# Patient Record
Sex: MALE | Race: WHITE | NOT HISPANIC OR LATINO | Employment: OTHER | URBAN - METROPOLITAN AREA
[De-identification: names, ages, dates, MRNs, and addresses within clinical notes are randomized per-mention and may not be internally consistent; named-entity substitution may affect disease eponyms.]

---

## 2019-07-20 ENCOUNTER — HOSPITAL ENCOUNTER (EMERGENCY)
Facility: HOSPITAL | Age: 84
Discharge: HOME | End: 2019-07-20
Attending: EMERGENCY MEDICINE | Admitting: EMERGENCY MEDICINE

## 2019-07-20 ENCOUNTER — APPOINTMENT (OUTPATIENT)
Dept: CT IMAGING | Facility: HOSPITAL | Age: 84
End: 2019-07-20

## 2019-07-20 VITALS
HEART RATE: 57 BPM | SYSTOLIC BLOOD PRESSURE: 139 MMHG | BODY MASS INDEX: 25.84 KG/M2 | HEIGHT: 73 IN | TEMPERATURE: 97.4 F | WEIGHT: 195 LBS | RESPIRATION RATE: 18 BRPM | OXYGEN SATURATION: 98 % | DIASTOLIC BLOOD PRESSURE: 76 MMHG

## 2019-07-20 DIAGNOSIS — R51.9 HEADACHE, UNSPECIFIED HEADACHE TYPE: Primary | ICD-10-CM

## 2019-07-20 LAB
ALBUMIN SERPL-MCNC: 4.1 G/DL (ref 3.5–5.2)
ALBUMIN/GLOB SERPL: 1.2 G/DL
ALP SERPL-CCNC: 76 U/L (ref 39–117)
ALT SERPL W P-5'-P-CCNC: 16 U/L (ref 1–41)
ANION GAP SERPL CALCULATED.3IONS-SCNC: 12.3 MMOL/L (ref 5–15)
AST SERPL-CCNC: 16 U/L (ref 1–40)
BACTERIA UR QL AUTO: ABNORMAL /HPF
BASOPHILS # BLD AUTO: 0.03 10*3/MM3 (ref 0–0.2)
BASOPHILS NFR BLD AUTO: 0.4 % (ref 0–1.5)
BILIRUB SERPL-MCNC: 0.7 MG/DL (ref 0.2–1.2)
BILIRUB UR QL STRIP: NEGATIVE
BUN BLD-MCNC: 14 MG/DL (ref 8–23)
BUN/CREAT SERPL: 14.1 (ref 7–25)
CALCIUM SPEC-SCNC: 10.5 MG/DL (ref 8.6–10.5)
CHLORIDE SERPL-SCNC: 106 MMOL/L (ref 98–107)
CLARITY UR: CLEAR
CO2 SERPL-SCNC: 22.7 MMOL/L (ref 22–29)
COLOR UR: ABNORMAL
CREAT BLD-MCNC: 0.99 MG/DL (ref 0.76–1.27)
DEPRECATED RDW RBC AUTO: 44 FL (ref 37–54)
EOSINOPHIL # BLD AUTO: 0.4 10*3/MM3 (ref 0–0.4)
EOSINOPHIL NFR BLD AUTO: 5.1 % (ref 0.3–6.2)
ERYTHROCYTE [DISTWIDTH] IN BLOOD BY AUTOMATED COUNT: 12.9 % (ref 12.3–15.4)
GFR SERPL CREATININE-BSD FRML MDRD: 72 ML/MIN/1.73
GLOBULIN UR ELPH-MCNC: 3.5 GM/DL
GLUCOSE BLD-MCNC: 151 MG/DL (ref 65–99)
GLUCOSE UR STRIP-MCNC: NEGATIVE MG/DL
HCT VFR BLD AUTO: 40.3 % (ref 37.5–51)
HGB BLD-MCNC: 13.5 G/DL (ref 13–17.7)
HGB UR QL STRIP.AUTO: NEGATIVE
HYALINE CASTS UR QL AUTO: ABNORMAL /LPF
IMM GRANULOCYTES # BLD AUTO: 0.03 10*3/MM3 (ref 0–0.05)
IMM GRANULOCYTES NFR BLD AUTO: 0.4 % (ref 0–0.5)
INR PPP: 1.04 (ref 0.9–1.1)
KETONES UR QL STRIP: NEGATIVE
LEUKOCYTE ESTERASE UR QL STRIP.AUTO: ABNORMAL
LYMPHOCYTES # BLD AUTO: 1.22 10*3/MM3 (ref 0.7–3.1)
LYMPHOCYTES NFR BLD AUTO: 15.5 % (ref 19.6–45.3)
MCH RBC QN AUTO: 31 PG (ref 26.6–33)
MCHC RBC AUTO-ENTMCNC: 33.5 G/DL (ref 31.5–35.7)
MCV RBC AUTO: 92.4 FL (ref 79–97)
MONOCYTES # BLD AUTO: 0.96 10*3/MM3 (ref 0.1–0.9)
MONOCYTES NFR BLD AUTO: 12.2 % (ref 5–12)
MUCOUS THREADS URNS QL MICRO: ABNORMAL /HPF
NEUTROPHILS # BLD AUTO: 5.25 10*3/MM3 (ref 1.7–7)
NEUTROPHILS NFR BLD AUTO: 66.4 % (ref 42.7–76)
NITRITE UR QL STRIP: POSITIVE
NRBC BLD AUTO-RTO: 0 /100 WBC (ref 0–0.2)
PH UR STRIP.AUTO: <=5 [PH] (ref 5–8)
PLATELET # BLD AUTO: 206 10*3/MM3 (ref 140–450)
PMV BLD AUTO: 10.4 FL (ref 6–12)
POTASSIUM BLD-SCNC: 4 MMOL/L (ref 3.5–5.2)
PROT SERPL-MCNC: 7.6 G/DL (ref 6–8.5)
PROT UR QL STRIP: NEGATIVE
PROTHROMBIN TIME: 13.4 SECONDS (ref 11.7–14.2)
RBC # BLD AUTO: 4.36 10*6/MM3 (ref 4.14–5.8)
RBC # UR: ABNORMAL /HPF
REF LAB TEST METHOD: ABNORMAL
SODIUM BLD-SCNC: 141 MMOL/L (ref 136–145)
SP GR UR STRIP: 1.02 (ref 1–1.03)
SQUAMOUS #/AREA URNS HPF: ABNORMAL /HPF
UROBILINOGEN UR QL STRIP: ABNORMAL
WBC NRBC COR # BLD: 7.89 10*3/MM3 (ref 3.4–10.8)
WBC UR QL AUTO: ABNORMAL /HPF

## 2019-07-20 PROCEDURE — 25010000002 ONDANSETRON PER 1 MG: Performed by: EMERGENCY MEDICINE

## 2019-07-20 PROCEDURE — 96374 THER/PROPH/DIAG INJ IV PUSH: CPT

## 2019-07-20 PROCEDURE — 99284 EMERGENCY DEPT VISIT MOD MDM: CPT

## 2019-07-20 PROCEDURE — 85610 PROTHROMBIN TIME: CPT | Performed by: EMERGENCY MEDICINE

## 2019-07-20 PROCEDURE — 80053 COMPREHEN METABOLIC PANEL: CPT | Performed by: EMERGENCY MEDICINE

## 2019-07-20 PROCEDURE — 96375 TX/PRO/DX INJ NEW DRUG ADDON: CPT

## 2019-07-20 PROCEDURE — 81001 URINALYSIS AUTO W/SCOPE: CPT | Performed by: EMERGENCY MEDICINE

## 2019-07-20 PROCEDURE — 70450 CT HEAD/BRAIN W/O DYE: CPT

## 2019-07-20 PROCEDURE — 85025 COMPLETE CBC W/AUTO DIFF WBC: CPT | Performed by: EMERGENCY MEDICINE

## 2019-07-20 PROCEDURE — 25010000002 HYDROMORPHONE PER 4 MG: Performed by: EMERGENCY MEDICINE

## 2019-07-20 RX ORDER — TAMSULOSIN HYDROCHLORIDE 0.4 MG/1
1 CAPSULE ORAL NIGHTLY
COMMUNITY

## 2019-07-20 RX ORDER — ATORVASTATIN CALCIUM 40 MG/1
40 TABLET, FILM COATED ORAL DAILY
COMMUNITY

## 2019-07-20 RX ORDER — OXYCODONE HYDROCHLORIDE AND ACETAMINOPHEN 5; 325 MG/1; MG/1
1-2 TABLET ORAL EVERY 6 HOURS PRN
Qty: 12 TABLET | Refills: 0 | Status: SHIPPED | OUTPATIENT
Start: 2019-07-20

## 2019-07-20 RX ORDER — PRASUGREL 10 MG/1
10 TABLET, FILM COATED ORAL DAILY
COMMUNITY

## 2019-07-20 RX ORDER — HYDROMORPHONE HYDROCHLORIDE 1 MG/ML
0.5 INJECTION, SOLUTION INTRAMUSCULAR; INTRAVENOUS; SUBCUTANEOUS ONCE
Status: COMPLETED | OUTPATIENT
Start: 2019-07-20 | End: 2019-07-20

## 2019-07-20 RX ORDER — ONDANSETRON 2 MG/ML
4 INJECTION INTRAMUSCULAR; INTRAVENOUS ONCE
Status: COMPLETED | OUTPATIENT
Start: 2019-07-20 | End: 2019-07-20

## 2019-07-20 RX ORDER — CARVEDILOL 3.12 MG/1
3.12 TABLET ORAL 2 TIMES DAILY WITH MEALS
COMMUNITY

## 2019-07-20 RX ADMIN — ONDANSETRON HYDROCHLORIDE 4 MG: 2 SOLUTION INTRAMUSCULAR; INTRAVENOUS at 07:28

## 2019-07-20 RX ADMIN — HYDROMORPHONE HYDROCHLORIDE 0.5 MG: 1 INJECTION, SOLUTION INTRAMUSCULAR; INTRAVENOUS; SUBCUTANEOUS at 07:29

## 2022-08-12 VITALS
DIASTOLIC BLOOD PRESSURE: 76 MMHG | DIASTOLIC BLOOD PRESSURE: 69 MMHG | SYSTOLIC BLOOD PRESSURE: 158 MMHG | SYSTOLIC BLOOD PRESSURE: 114 MMHG | HEART RATE: 84 BPM | HEART RATE: 86 BPM | DIASTOLIC BLOOD PRESSURE: 66 MMHG | SYSTOLIC BLOOD PRESSURE: 110 MMHG | DIASTOLIC BLOOD PRESSURE: 78 MMHG | TEMPERATURE: 98.4 F | HEART RATE: 89 BPM | DIASTOLIC BLOOD PRESSURE: 67 MMHG | HEART RATE: 90 BPM | RESPIRATION RATE: 15 BRPM | SYSTOLIC BLOOD PRESSURE: 141 MMHG | SYSTOLIC BLOOD PRESSURE: 120 MMHG | DIASTOLIC BLOOD PRESSURE: 68 MMHG | HEIGHT: 72 IN | RESPIRATION RATE: 14 BRPM | HEART RATE: 72 BPM | RESPIRATION RATE: 23 BRPM | HEART RATE: 94 BPM | SYSTOLIC BLOOD PRESSURE: 118 MMHG | OXYGEN SATURATION: 95 % | DIASTOLIC BLOOD PRESSURE: 65 MMHG | RESPIRATION RATE: 13 BRPM | DIASTOLIC BLOOD PRESSURE: 71 MMHG | HEART RATE: 103 BPM | DIASTOLIC BLOOD PRESSURE: 100 MMHG | SYSTOLIC BLOOD PRESSURE: 119 MMHG | DIASTOLIC BLOOD PRESSURE: 88 MMHG | WEIGHT: 190 LBS | RESPIRATION RATE: 16 BRPM | SYSTOLIC BLOOD PRESSURE: 117 MMHG | SYSTOLIC BLOOD PRESSURE: 166 MMHG | OXYGEN SATURATION: 97 % | RESPIRATION RATE: 18 BRPM | HEART RATE: 109 BPM | SYSTOLIC BLOOD PRESSURE: 111 MMHG | OXYGEN SATURATION: 94 % | HEART RATE: 83 BPM | OXYGEN SATURATION: 93 %

## 2022-08-16 ENCOUNTER — AMBULATORY SURGICAL CENTER (AMBULATORY)
Dept: URBAN - METROPOLITAN AREA SURGERY 17 | Facility: SURGERY | Age: 87
End: 2022-08-16
Payer: OTHER GOVERNMENT

## 2022-08-16 ENCOUNTER — OFFICE (AMBULATORY)
Dept: URBAN - METROPOLITAN AREA PATHOLOGY 4 | Facility: PATHOLOGY | Age: 87
End: 2022-08-16
Payer: OTHER GOVERNMENT

## 2022-08-16 DIAGNOSIS — D37.4 NEOPLASM OF UNCERTAIN BEHAVIOR OF COLON: ICD-10-CM

## 2022-08-16 DIAGNOSIS — Z85.038 PERSONAL HISTORY OF OTHER MALIGNANT NEOPLASM OF LARGE INTEST: ICD-10-CM

## 2022-08-16 DIAGNOSIS — C20 MALIGNANT NEOPLASM OF RECTUM: ICD-10-CM

## 2022-08-16 DIAGNOSIS — K56.609 UNSPECIFIED INTESTINAL OBSTRUCTION, UNSPECIFIED AS TO PARTIA: ICD-10-CM

## 2022-08-16 DIAGNOSIS — D12.3 BENIGN NEOPLASM OF TRANSVERSE COLON: ICD-10-CM

## 2022-08-16 DIAGNOSIS — K63.5 POLYP OF COLON: ICD-10-CM

## 2022-08-16 DIAGNOSIS — D12.2 BENIGN NEOPLASM OF ASCENDING COLON: ICD-10-CM

## 2022-08-16 LAB
GI HISTOLOGY: A. UNSPECIFIED: (no result)
GI HISTOLOGY: B. UNSPECIFIED: (no result)
GI HISTOLOGY: C. UNSPECIFIED: (no result)
GI HISTOLOGY: PDF REPORT: (no result)

## 2022-08-16 PROCEDURE — 88342 IMHCHEM/IMCYTCHM 1ST ANTB: CPT | Mod: 26 | Performed by: INTERNAL MEDICINE

## 2022-08-16 PROCEDURE — 88305 TISSUE EXAM BY PATHOLOGIST: CPT | Performed by: INTERNAL MEDICINE

## 2022-08-16 PROCEDURE — 45385 COLONOSCOPY W/LESION REMOVAL: CPT | Performed by: INTERNAL MEDICINE

## 2022-08-16 PROCEDURE — 45380 COLONOSCOPY AND BIOPSY: CPT | Mod: 59 | Performed by: INTERNAL MEDICINE

## 2022-08-16 PROCEDURE — 88341 IMHCHEM/IMCYTCHM EA ADD ANTB: CPT | Mod: 26 | Performed by: INTERNAL MEDICINE

## 2022-08-16 NOTE — SERVICENOTES
Patient declined surgery at time of diagnosis as he did not want an ostomy.  Daughter to call his oncologist at [facility] to arrange follow-up.

## 2023-03-18 ENCOUNTER — HOSPITAL ENCOUNTER (EMERGENCY)
Facility: HOSPITAL | Age: 88
Discharge: HOME | End: 2023-03-18
Attending: EMERGENCY MEDICINE | Admitting: EMERGENCY MEDICINE
Payer: MEDICARE

## 2023-03-18 ENCOUNTER — APPOINTMENT (OUTPATIENT)
Dept: CT IMAGING | Facility: HOSPITAL | Age: 88
End: 2023-03-18
Payer: MEDICARE

## 2023-03-18 VITALS
HEART RATE: 112 BPM | TEMPERATURE: 96.3 F | RESPIRATION RATE: 20 BRPM | HEIGHT: 73 IN | SYSTOLIC BLOOD PRESSURE: 161 MMHG | OXYGEN SATURATION: 97 % | WEIGHT: 180 LBS | DIASTOLIC BLOOD PRESSURE: 90 MMHG | BODY MASS INDEX: 23.86 KG/M2

## 2023-03-18 DIAGNOSIS — K59.00 CONSTIPATION, UNSPECIFIED CONSTIPATION TYPE: ICD-10-CM

## 2023-03-18 DIAGNOSIS — R53.1 GENERALIZED WEAKNESS: ICD-10-CM

## 2023-03-18 DIAGNOSIS — U07.1 COVID-19 VIRUS INFECTION: Primary | ICD-10-CM

## 2023-03-18 LAB
ALBUMIN SERPL-MCNC: 4.1 G/DL (ref 3.5–5.2)
ALBUMIN/GLOB SERPL: 1.4 G/DL
ALP SERPL-CCNC: 86 U/L (ref 39–117)
ALT SERPL W P-5'-P-CCNC: 21 U/L (ref 1–41)
ANION GAP SERPL CALCULATED.3IONS-SCNC: 9.7 MMOL/L (ref 5–15)
AST SERPL-CCNC: 21 U/L (ref 1–40)
BACTERIA UR QL AUTO: NORMAL /HPF
BASOPHILS # BLD AUTO: 0.01 10*3/MM3 (ref 0–0.2)
BASOPHILS NFR BLD AUTO: 0.2 % (ref 0–1.5)
BILIRUB SERPL-MCNC: 0.6 MG/DL (ref 0–1.2)
BILIRUB UR QL STRIP: NEGATIVE
BUN SERPL-MCNC: 22 MG/DL (ref 8–23)
BUN/CREAT SERPL: 22.7 (ref 7–25)
CALCIUM SPEC-SCNC: 10.5 MG/DL (ref 8.6–10.5)
CHLORIDE SERPL-SCNC: 106 MMOL/L (ref 98–107)
CLARITY UR: CLEAR
CO2 SERPL-SCNC: 27.3 MMOL/L (ref 22–29)
COLOR UR: YELLOW
CREAT SERPL-MCNC: 0.97 MG/DL (ref 0.76–1.27)
DEPRECATED RDW RBC AUTO: 46.9 FL (ref 37–54)
EGFRCR SERPLBLD CKD-EPI 2021: 76.5 ML/MIN/1.73
EOSINOPHIL # BLD AUTO: 0.08 10*3/MM3 (ref 0–0.4)
EOSINOPHIL NFR BLD AUTO: 1.3 % (ref 0.3–6.2)
ERYTHROCYTE [DISTWIDTH] IN BLOOD BY AUTOMATED COUNT: 14 % (ref 12.3–15.4)
FLUAV RNA RESP QL NAA+PROBE: NOT DETECTED
FLUBV RNA RESP QL NAA+PROBE: NOT DETECTED
GLOBULIN UR ELPH-MCNC: 3 GM/DL
GLUCOSE SERPL-MCNC: 156 MG/DL (ref 65–99)
GLUCOSE UR STRIP-MCNC: NEGATIVE MG/DL
HCT VFR BLD AUTO: 41.9 % (ref 37.5–51)
HGB BLD-MCNC: 14.2 G/DL (ref 13–17.7)
HGB UR QL STRIP.AUTO: NEGATIVE
HYALINE CASTS UR QL AUTO: NORMAL /LPF
IMM GRANULOCYTES # BLD AUTO: 0.03 10*3/MM3 (ref 0–0.05)
IMM GRANULOCYTES NFR BLD AUTO: 0.5 % (ref 0–0.5)
KETONES UR QL STRIP: NEGATIVE
LEUKOCYTE ESTERASE UR QL STRIP.AUTO: NEGATIVE
LIPASE SERPL-CCNC: 17 U/L (ref 13–60)
LYMPHOCYTES # BLD AUTO: 0.77 10*3/MM3 (ref 0.7–3.1)
LYMPHOCYTES NFR BLD AUTO: 12.2 % (ref 19.6–45.3)
MCH RBC QN AUTO: 30.8 PG (ref 26.6–33)
MCHC RBC AUTO-ENTMCNC: 33.9 G/DL (ref 31.5–35.7)
MCV RBC AUTO: 90.9 FL (ref 79–97)
MONOCYTES # BLD AUTO: 0.53 10*3/MM3 (ref 0.1–0.9)
MONOCYTES NFR BLD AUTO: 8.4 % (ref 5–12)
NEUTROPHILS NFR BLD AUTO: 4.91 10*3/MM3 (ref 1.7–7)
NEUTROPHILS NFR BLD AUTO: 77.4 % (ref 42.7–76)
NITRITE UR QL STRIP: NEGATIVE
NRBC BLD AUTO-RTO: 0 /100 WBC (ref 0–0.2)
PH UR STRIP.AUTO: <=5 [PH] (ref 5–8)
PLATELET # BLD AUTO: 243 10*3/MM3 (ref 140–450)
PMV BLD AUTO: 10.2 FL (ref 6–12)
POTASSIUM SERPL-SCNC: 4.8 MMOL/L (ref 3.5–5.2)
PROT SERPL-MCNC: 7.1 G/DL (ref 6–8.5)
PROT UR QL STRIP: ABNORMAL
RBC # BLD AUTO: 4.61 10*6/MM3 (ref 4.14–5.8)
RBC # UR STRIP: NORMAL /HPF
REF LAB TEST METHOD: NORMAL
RSV RNA NPH QL NAA+NON-PROBE: NOT DETECTED
SARS-COV-2 RNA RESP QL NAA+PROBE: DETECTED
SODIUM SERPL-SCNC: 143 MMOL/L (ref 136–145)
SP GR UR STRIP: 1.03 (ref 1–1.03)
SQUAMOUS #/AREA URNS HPF: NORMAL /HPF
UROBILINOGEN UR QL STRIP: ABNORMAL
WBC # UR STRIP: NORMAL /HPF
WBC NRBC COR # BLD: 6.33 10*3/MM3 (ref 3.4–10.8)

## 2023-03-18 PROCEDURE — 99284 EMERGENCY DEPT VISIT MOD MDM: CPT

## 2023-03-18 PROCEDURE — 80053 COMPREHEN METABOLIC PANEL: CPT | Performed by: EMERGENCY MEDICINE

## 2023-03-18 PROCEDURE — 87637 SARSCOV2&INF A&B&RSV AMP PRB: CPT | Performed by: EMERGENCY MEDICINE

## 2023-03-18 PROCEDURE — 85025 COMPLETE CBC W/AUTO DIFF WBC: CPT | Performed by: EMERGENCY MEDICINE

## 2023-03-18 PROCEDURE — 74176 CT ABD & PELVIS W/O CONTRAST: CPT

## 2023-03-18 PROCEDURE — 83690 ASSAY OF LIPASE: CPT | Performed by: EMERGENCY MEDICINE

## 2023-03-18 PROCEDURE — 81001 URINALYSIS AUTO W/SCOPE: CPT | Performed by: EMERGENCY MEDICINE

## 2023-03-18 RX ORDER — DOCUSATE SODIUM 100 MG/1
100 CAPSULE, LIQUID FILLED ORAL 2 TIMES DAILY PRN
Qty: 30 CAPSULE | Refills: 0 | Status: SHIPPED | OUTPATIENT
Start: 2023-03-18

## 2023-03-18 RX ORDER — SODIUM CHLORIDE 0.9 % (FLUSH) 0.9 %
10 SYRINGE (ML) INJECTION AS NEEDED
Status: DISCONTINUED | OUTPATIENT
Start: 2023-03-18 | End: 2023-03-18 | Stop reason: HOSPADM

## 2023-03-18 RX ORDER — POLYETHYLENE GLYCOL 3350 17 G/17G
17 POWDER, FOR SOLUTION ORAL DAILY PRN
Qty: 20 EACH | Refills: 0 | Status: SHIPPED | OUTPATIENT
Start: 2023-03-18

## 2023-03-18 RX ADMIN — SODIUM CHLORIDE 500 ML: 9 INJECTION, SOLUTION INTRAVENOUS at 08:38

## 2023-03-18 NOTE — ED NOTES
Patient was placed in face mask in first look.  I was wearing a face CAPR throughout our encounter.  I wore gown, mask, face shield throughout the encounter.  Hand hygiene was performed before and after patient encounter.  Isolation maintained.

## 2023-03-18 NOTE — ED PROVIDER NOTES
EMERGENCY DEPARTMENT ENCOUNTER    Room Number:  11/11  Date of encounter:  3/18/2023  PCP: Provider, No Known  Historian: Patient    Patient was placed in face mask during triage process. Patient was wearing facemask when I entered the room and throughout our encounter. I wore full protective equipment throughout this patient encounter including a face mask, eye protection, and gloves. Hand hygiene was performed before donning protective equipment and again following doffing of PPE after leaving the room.    HPI:  Chief Complaint: Abdominal discomfort and generalized weakness  A complete HPI/ROS/PMH/PSH/SH/FH are unobtainable due to: N/A   Context: Hai Magaña is a 87 y.o. male who presents to the ED c/o abdominal discomfort and generalized weakness.  Patient reports he has been ill for about a week and a half and was diagnosed with COVID-19 on 9 March.  He reports that he was admitted for 2 days at the [facility] because he was not hypoxemic he was discharged 2 days ago.  He still does not feel back to his normal self.  He reports diminished stool output of his ostomy as well as diminished oral intake.  No chest pain, shortness of breath, cough or fevers reported.  No dysuria hematuria.  Reports he is primarily just low energy.      MEDICAL HISTORY REVIEW  EMR reviewed:    Patient was seen by oncology 2/13/2023 requesting genetic counseling the [place] [facility] [place] for his personal history of colorectal cancer as well as a family history of gastric cancer and leukemia    PAST MEDICAL HISTORY  Active Ambulatory Problems     Diagnosis Date Noted   • No Active Ambulatory Problems     Resolved Ambulatory Problems     Diagnosis Date Noted   • No Resolved Ambulatory Problems     Past Medical History:   Diagnosis Date   • Colon cancer (HCC)    • Coronary artery disease    • Hyperlipidemia    • Hypertension    • Kidney stone          PAST SURGICAL HISTORY  Past Surgical History:   Procedure Laterality Date   •  COLOSTOMY     • CORONARY ANGIOPLASTY WITH STENT PLACEMENT           FAMILY HISTORY  History reviewed. No pertinent family history.      SOCIAL HISTORY  Social History     Socioeconomic History   • Marital status:    Tobacco Use   • Smoking status: Former   Vaping Use   • Vaping Use: Never used   Substance and Sexual Activity   • Alcohol use: No   • Drug use: No   • Sexual activity: Defer         ALLERGIES  Lidocaine        REVIEW OF SYSTEMS  Review of Systems     All systems reviewed and negative except for those discussed in HPI.       PHYSICAL EXAM    I have reviewed the triage vital signs and nursing notes.    ED Triage Vitals [03/18/23 0709]   Temp Heart Rate Resp BP SpO2   96.3 °F (35.7 °C) 63 20 161/90 97 %      Temp src Heart Rate Source Patient Position BP Location FiO2 (%)   -- -- -- -- --       Physical Exam    Physical Exam   Constitutional: No distress.  Nontoxic  HENT:  Head: Normocephalic and atraumatic.   Oropharynx: Mucous membranes are moist.   Eyes: No scleral icterus. No conjunctival pallor.  Neck: Painless range of motion noted. Neck supple.   Cardiovascular: Pink warm and well-perfused.  Pulmonary/Chest: No respiratory distress.  No tachypnea or increased work of abdominal: Protuberant abdomen that is soft.  Mild diffuse discomfort with palpation without focal.  Colostomy present left abdomen  Musculoskeletal: Moves all extremities equally. There is no pedal edema or calf tenderness.   Neurological: Alert.  Baseline strength and sensation noted.   Skin: Skin is pink, warm, and dry. No pallor.   Psychiatric: Mood and affect normal.   Nursing note and vitals reviewed.    LAB RESULTS  Recent Results (from the past 24 hour(s))   Comprehensive Metabolic Panel    Collection Time: 03/18/23  7:57 AM    Specimen: Blood   Result Value Ref Range    Glucose 156 (H) 65 - 99 mg/dL    BUN 22 8 - 23 mg/dL    Creatinine 0.97 0.76 - 1.27 mg/dL    Sodium 143 136 - 145 mmol/L    Potassium 4.8 3.5 - 5.2  mmol/L    Chloride 106 98 - 107 mmol/L    CO2 27.3 22.0 - 29.0 mmol/L    Calcium 10.5 8.6 - 10.5 mg/dL    Total Protein 7.1 6.0 - 8.5 g/dL    Albumin 4.1 3.5 - 5.2 g/dL    ALT (SGPT) 21 1 - 41 U/L    AST (SGOT) 21 1 - 40 U/L    Alkaline Phosphatase 86 39 - 117 U/L    Total Bilirubin 0.6 0.0 - 1.2 mg/dL    Globulin 3.0 gm/dL    A/G Ratio 1.4 g/dL    BUN/Creatinine Ratio 22.7 7.0 - 25.0    Anion Gap 9.7 5.0 - 15.0 mmol/L    eGFR 76.5 >60.0 mL/min/1.73   Lipase    Collection Time: 03/18/23  7:57 AM    Specimen: Blood   Result Value Ref Range    Lipase 17 13 - 60 U/L   CBC Auto Differential    Collection Time: 03/18/23  7:57 AM    Specimen: Blood   Result Value Ref Range    WBC 6.33 3.40 - 10.80 10*3/mm3    RBC 4.61 4.14 - 5.80 10*6/mm3    Hemoglobin 14.2 13.0 - 17.7 g/dL    Hematocrit 41.9 37.5 - 51.0 %    MCV 90.9 79.0 - 97.0 fL    MCH 30.8 26.6 - 33.0 pg    MCHC 33.9 31.5 - 35.7 g/dL    RDW 14.0 12.3 - 15.4 %    RDW-SD 46.9 37.0 - 54.0 fl    MPV 10.2 6.0 - 12.0 fL    Platelets 243 140 - 450 10*3/mm3    Neutrophil % 77.4 (H) 42.7 - 76.0 %    Lymphocyte % 12.2 (L) 19.6 - 45.3 %    Monocyte % 8.4 5.0 - 12.0 %    Eosinophil % 1.3 0.3 - 6.2 %    Basophil % 0.2 0.0 - 1.5 %    Immature Grans % 0.5 0.0 - 0.5 %    Neutrophils, Absolute 4.91 1.70 - 7.00 10*3/mm3    Lymphocytes, Absolute 0.77 0.70 - 3.10 10*3/mm3    Monocytes, Absolute 0.53 0.10 - 0.90 10*3/mm3    Eosinophils, Absolute 0.08 0.00 - 0.40 10*3/mm3    Basophils, Absolute 0.01 0.00 - 0.20 10*3/mm3    Immature Grans, Absolute 0.03 0.00 - 0.05 10*3/mm3    nRBC 0.0 0.0 - 0.2 /100 WBC   COVID-19, FLU A/B, RSV PCR - Swab, Nasopharynx    Collection Time: 03/18/23  7:58 AM    Specimen: Nasopharynx; Swab   Result Value Ref Range    COVID19 Detected (C) Not Detected - Ref. Range    Influenza A PCR Not Detected Not Detected    Influenza B PCR Not Detected Not Detected    RSV, PCR Not Detected Not Detected   Urinalysis With Microscopic If Indicated (No Culture) - Urine, Clean  Catch    Collection Time: 03/18/23  8:36 AM    Specimen: Urine, Clean Catch   Result Value Ref Range    Color, UA Yellow Yellow, Straw    Appearance, UA Clear Clear    pH, UA <=5.0 5.0 - 8.0    Specific Gravity, UA 1.029 1.005 - 1.030    Glucose, UA Negative Negative    Ketones, UA Negative Negative    Bilirubin, UA Negative Negative    Blood, UA Negative Negative    Protein,  mg/dL (2+) (A) Negative    Leuk Esterase, UA Negative Negative    Nitrite, UA Negative Negative    Urobilinogen, UA 1.0 E.U./dL 0.2 - 1.0 E.U./dL   Urinalysis, Microscopic Only - Urine, Clean Catch    Collection Time: 03/18/23  8:36 AM    Specimen: Urine, Clean Catch   Result Value Ref Range    RBC, UA 0-2 None Seen, 0-2 /HPF    WBC, UA 0-2 None Seen, 0-2 /HPF    Bacteria, UA None Seen None Seen /HPF    Squamous Epithelial Cells, UA 0-2 None Seen, 0-2 /HPF    Hyaline Casts, UA 3-6 None Seen /LPF    Methodology Automated Microscopy        Ordered the above labs and independently reviewed the results.        RADIOLOGY  CT Abdomen Pelvis Without Contrast    Result Date: 3/18/2023  CT ABDOMEN AND PELVIS WITHOUT IV CONTRAST  HISTORY: Abdominal discomfort, diminished colostomy output  TECHNIQUE: Radiation dose reduction techniques were utilized, including automated exposure control and exposure modulation based on body size. 3 mm images were obtained through the abdomen and pelvis without IV contrast.  COMPARISON: None  FINDINGS: Please note evaluation is suboptimal without intravenous contrast.  Small pericardial effusion is incompletely visualized. There are no findings of small bowel obstruction. The appendix is unremarkable.  Subcentimeter hepatic lesions are too small to characterize. Larger hypodense lesions demonstrating density cystic density are likely benign. The gallbladder, pancreas, spleen and adrenal glands have an unremarkable noncontrast CT appearance.  Subcentimeter renal lesions are too small to characterize. Larger hypodense  lesions demonstrating density less than 15 Hounsfield units are likely benign per Bosniak 2019 criteria. There is bilateral nephrolithiasis measuring up to approximately 7 to 8 mm. No hydronephrosis.  No abdominopelvic adenopathy by size criteria.. Postsurgical changes from partial colectomy and left lower quadrant colostomy are present. There is increased fat stranding surrounding the distal bowel loop located more inferiorly within the colostomy which also has a thickened appearance when compared with the more superior proximal loop of bowel within the the colostomy. No free intraperitoneal air is seen. There is relatively diffuse soft tissue thickening and fat stranding surrounding the bladder, prostate and presacral soft tissues. Prostate is moderate to severely enlarged. The bladder is decompressed and cannot be evaluated.  No suspicious lytic or blastic osseous lesion is present.      1.  Postsurgical changes from partial colectomy and left lower quadrant colostomy formation. The more distal segment of colon located inferiorly within the colostomy has a thickened appearance with increased pericolonic stranding when compared to the more superior loop suggestive of colitis. 2.  There is relatively diffuse fat stranding surrounding the bladder, prostate and presacral soft tissues. Findings can be seen in the setting of prior radiation to the pelvis (such as in the setting of prostate cancer). Correlation with patient history is recommended and if there is no history of prostate cancer, other differential considerations include cystitis, prostatitis and/or proctitis in the appropriate context and correlation with patient history is recommended with urinalysis if clinically indicated. 3.  Bilateral nephrolithiasis without hydronephrosis. 4.  Other findings as above.  This report was finalized on 3/18/2023 9:01 AM by Dr. Trevon Rene M.D.        I ordered the above noted radiological studies. Reviewed by me and  discussed with radiologist.  See dictation for official radiology interpretation.      PROCEDURES    Procedures        MEDICATIONS GIVEN IN ER    Medications   sodium chloride 0.9 % flush 10 mL (has no administration in time range)   sodium chloride 0.9 % bolus 500 mL (500 mL Intravenous New Bag 3/18/23 0838)         PROGRESS, DATA ANALYSIS, CONSULTS, AND MEDICAL DECISION MAKING    My differential diagnosis includes but is not limited to generalized weakness, electrolyte abnormality, CVA, TIA, Bell's palsy, acute MI, GI bleed, urinary tract infection, systemic infections including sepsis, alcohol abuse, drug abuse including prescription and street drug.      All labs have been independently reviewed by me.  All radiology studies have been reviewed by me and discussed with radiologist dictating the report.   EKG's independently viewed and interpreted by me.  Discussion below represents my analysis of pertinent findings related to patient's condition, differential diagnosis, treatment plan and final disposition.      ED Course as of 03/18/23 0938   Sat Mar 18, 2023   0828 BUN: 22 [RS]   0828 Creatinine: 0.97 [RS]   0828 Sodium: 143 [RS]   0828 Potassium: 4.8 [RS]   0828 ALT (SGPT): 21 [RS]   0828 AST (SGOT): 21 [RS]   0828 Total Bilirubin: 0.6 [RS]   0854 Influenza A PCR: Not Detected [RS]   0854 Influenza B PCR: Not Detected [RS]   0854 RSV, PCR: Not Detected [RS]   0854 WBC: 6.33 [RS]   0854 Hemoglobin: 14.2 [RS]   0854 Immature Grans, Absolute: 0.03 [RS]   0855 RADIOLOGY      Study: Noncontrast CT abdomen pelvis  Findings: No free intraperitoneal air appreciated.  Moderate stool burden in colon.  I independently viewed and interpreted these images contemporaneously with treatment.    [RS]   0928 Leukocytes, UA: Negative [RS]   0928 Nitrite, UA: Negative [RS]   0928 WBC, UA: 0-2 [RS]   0928 Bacteria, UA: None Seen [RS]   0929 No acute life threats identified.  Plan Colace for constipation.  We will attempt  ambulation in ED to assess for tolerance. [RS]   0921 All findings reviewed with patient.  He is agreeable with plan for discharge.  He has been up and ambulated in his room without difficulty and in fact tells me that he feels better standing [RS]      ED Course User Index  [RS] Siddhartha Diaz MD       AS OF 09:38 EDT VITALS:    BP - 161/90  HR - 112  TEMP - 96.3 °F (35.7 °C)  O2 SATS - 97%        DIAGNOSIS  Final diagnoses:   COVID-19 virus infection   Generalized weakness   Constipation, unspecified constipation type         DISPOSITION  DISCHARGE    Patient discharged in stable condition.    Reviewed implications of results, diagnosis, meds, responsibility to follow up, warning signs and symptoms of possible worsening, potential complications and reasons to return to ER.    Patient/Family voiced understanding of above instructions.    Discussed plan for discharge, as there is no emergent indication for admission. Patient referred to primary care provider for regular health maintenance. Pt/family is agreeable and understands need for follow up and possible repeat testing.  Pt is aware that discharge does not mean that nothing is wrong but it indicates no emergency is present that requires admission and they must continue care with follow-up as given below or physician of their choice.     FOLLOW-UP  Your primary care provider  [facility]  Call in 2 days  Schedule close outpatient follow-up         Medication List      New Prescriptions    docusate sodium 100 MG capsule  Commonly known as: COLACE  Take 1 capsule by mouth 2 (Two) Times a Day As Needed for Constipation.        Changed    polyethylene glycol 17 g packet  Commonly known as: MiraLax  Take 17 g by mouth Daily As Needed (Constipation).  What changed:   · medication strength  · how much to take  · when to take this  · reasons to take this           Where to Get Your Medications      You can get these medications from any pharmacy    Bring a paper prescription  for each of these medications  · docusate sodium 100 MG capsule  · polyethylene glycol 17 g packet            Siddhartha Diaz MD  03/18/23 09

## 2023-06-08 ENCOUNTER — TELEPHONE (OUTPATIENT)
Dept: NEUROSURGERY | Facility: CLINIC | Age: 88
End: 2023-06-08
Payer: OTHER GOVERNMENT

## 2023-06-08 NOTE — TELEPHONE ENCOUNTER
[facility] he is in the hospital and he will call to R/s once he is discharged

## 2023-06-08 NOTE — TELEPHONE ENCOUNTER
PATIENT CALLED IN TO MAKE SURE HIS APPOINTMENT WAS CANCELED - MADE HIM AWARE IT WAS, HE WILL CALL BACK TO RESCHEDULE

## 2023-06-29 ENCOUNTER — TELEPHONE (OUTPATIENT)
Dept: NEUROSURGERY | Facility: CLINIC | Age: 88
End: 2023-06-29

## 2023-08-11 ENCOUNTER — TELEPHONE (OUTPATIENT)
Dept: NEUROSURGERY | Facility: CLINIC | Age: 88
End: 2023-08-11
Payer: OTHER GOVERNMENT

## 2023-08-11 NOTE — TELEPHONE ENCOUNTER
Caller: Hai Magaña    Relationship to patient: Self    Best call back number: [phone]     Patient is needing:    PATIENT HAD TO RESCHEDULE CT, NEEDS F/U APPT ADJUST FOR AFTER HIS CT 9/15.    PLEASE CALL TO ADVISE

## 2023-09-15 ENCOUNTER — TELEPHONE (OUTPATIENT)
Dept: NEUROSURGERY | Facility: CLINIC | Age: 88
End: 2023-09-15
Payer: OTHER GOVERNMENT

## 2023-09-15 NOTE — TELEPHONE ENCOUNTER
Caller: Hai Magaña    Relationship to patient: Self    Best call back number: [phone]     Patient is needing: HAD ISSUES WITH TRANSPORTATION FOR [facility] WITH [facility].   HAD TO RESCHEDULE CT AND NOW NEEDS TO RESCHEDULE APPT WITH DR BARNHART    PLEASE CALL TO R/S

## 2023-09-20 ENCOUNTER — TELEPHONE (OUTPATIENT)
Dept: NEUROSURGERY | Facility: CLINIC | Age: 88
End: 2023-09-20

## 2023-09-20 NOTE — TELEPHONE ENCOUNTER
The [facility] received a fax that requires your attention. The document has been indexed to the patient’s chart for your review.      Reason for sending: FOR REVIEW    Documents Description: AUTH [id number] 04/13/23 TO 01/10/24    Name of Sender: AXEL DIAMOND DPT [facility]     Date Indexed: 09/20/2023     Notes (if needed): PLEASE REVIEW

## 2023-09-27 ENCOUNTER — TRANSCRIBE ORDERS (OUTPATIENT)
Dept: ADMINISTRATIVE | Facility: HOSPITAL | Age: 88
End: 2023-09-27
Payer: OTHER GOVERNMENT

## 2023-09-27 DIAGNOSIS — Z93.2 ILEOSTOMY STATUS: Primary | ICD-10-CM

## 2023-10-19 ENCOUNTER — HOSPITAL ENCOUNTER (OUTPATIENT)
Dept: CT IMAGING | Facility: HOSPITAL | Age: 88
Discharge: HOME | End: 2023-10-19
Admitting: NEUROLOGICAL SURGERY
Payer: OTHER GOVERNMENT

## 2023-10-19 DIAGNOSIS — S06.5XAA SDH (SUBDURAL HEMATOMA): ICD-10-CM

## 2023-10-19 DIAGNOSIS — Z93.2 ILEOSTOMY STATUS: ICD-10-CM

## 2023-10-19 LAB — CREAT BLDA-MCNC: 1.1 MG/DL (ref 0.6–1.3)

## 2023-10-19 PROCEDURE — 25510000001 IOPAMIDOL 61 % SOLUTION: Performed by: NEUROLOGICAL SURGERY

## 2023-10-19 PROCEDURE — 70450 CT HEAD/BRAIN W/O DYE: CPT

## 2023-10-19 PROCEDURE — 0 DIATRIZOATE MEGLUMINE & SODIUM PER 1 ML: Performed by: NEUROLOGICAL SURGERY

## 2023-10-19 PROCEDURE — 74177 CT ABD & PELVIS W/CONTRAST: CPT

## 2023-10-19 PROCEDURE — 82565 ASSAY OF CREATININE: CPT

## 2023-10-19 RX ADMIN — DIATRIZOATE MEGLUMINE AND DIATRIZOATE SODIUM 30 ML: 660; 100 LIQUID ORAL; RECTAL at 11:00

## 2023-10-19 RX ADMIN — IOPAMIDOL 85 ML: 612 INJECTION, SOLUTION INTRAVENOUS at 12:14

## 2023-12-01 ENCOUNTER — TELEPHONE (OUTPATIENT)
Dept: NEUROSURGERY | Facility: CLINIC | Age: 88
End: 2023-12-01
Payer: OTHER GOVERNMENT

## 2023-12-01 NOTE — TELEPHONE ENCOUNTER
PATIENTS APPT IS SCHEDULED AS A FOLLOW UP FOR 30 MINS.  ATTEMPTED TO RESCHEDULE AS FOLLOW UP EXT.  F/A WAS 1/19/2024 PATIENTS [facility] AUTH IS ONLY AUTHORIZED THROUGH 1/10/2024.

## 2023-12-01 NOTE — TELEPHONE ENCOUNTER
PATIENT CALLED AND STATES HE NEEDS TO RESCHEDULE HIS APPT FOR 12/8/2023.  STATES THE [facility] WILL NOT COME PICK HIM UP AND BRING HIM FOR HIS APPT THAT EARLY.  PATIENT STATES THEY START PICKING UP AT 10AM.    ATTEMPTED TO RESCHEDULE VISIT BUT THERE WAS NO DATES AVAILABLE.      PLEASE CALL PATIENT   THANK YOU

## 2023-12-04 NOTE — TELEPHONE ENCOUNTER
Called patient he stated that the [facility] will go ahead and pick him up on scheduled day to get him to his appointment. I have also faxed in paperwork to update patient's referral.

## 2023-12-18 NOTE — PROGRESS NOTES
Subjective   History of Present Illness: Hai Magaña is a 88 y.o. male is here today for follow-up on SDH. CT head completed on 10/19/23. He denies any HA, dizziness or vision changes.       The following portions of the patient's history were reviewed and updated as appropriate: allergies, current medications, past family history, past medical history, past social history, past surgical history, and problem list.      Past Medical History:   Diagnosis Date    Colon cancer     Coronary artery disease     Hyperlipidemia     Hypertension     Kidney stone         Past Surgical History:   Procedure Laterality Date    COLOSTOMY      CORONARY ANGIOPLASTY WITH STENT PLACEMENT            Current Outpatient Medications:     atorvastatin (LIPITOR) 40 MG tablet, Take 1 tablet by mouth Daily., Disp: , Rfl:     carvedilol (COREG) 3.125 MG tablet, Take 1 tablet by mouth 2 (Two) Times a Day With Meals., Disp: , Rfl:     docusate sodium (COLACE) 100 MG capsule, Take 1 capsule by mouth 2 (Two) Times a Day As Needed for Constipation., Disp: 30 capsule, Rfl: 0    hydrocortisone (ANUSOL-HC) 2.5 % rectal cream, Insert  into the rectum 2 (Two) Times a Day., Disp: , Rfl:     isosorbide dinitrate (ISORDIL) 40 MG tablet, Take 1 tablet by mouth 3 (Three) Times a Day., Disp: , Rfl:     oxyCODONE-acetaminophen (PERCOCET) 5-325 MG per tablet, Take 1-2 tablets by mouth Every 6 (Six) Hours As Needed (pain)., Disp: 12 tablet, Rfl: 0    pantoprazole (PROTONIX) 40 MG EC tablet, Take 1 tablet by mouth Daily., Disp: , Rfl:     polyethylene glycol (MiraLax) 17 g packet, Take 17 g by mouth Daily As Needed (Constipation)., Disp: 20 each, Rfl: 0    prasugrel (EFFIENT) 10 MG tablet, Take 1 tablet by mouth Daily., Disp: , Rfl:     rosuvastatin (CRESTOR) 5 MG tablet, Take 1 tablet by mouth Daily., Disp: , Rfl:     tamsulosin (FLOMAX) 0.4 MG capsule 24 hr capsule, Take 1 capsule by mouth Every Night., Disp: , Rfl:      Allergies   Allergen Reactions     "Lidocaine Itching        Social History     Socioeconomic History    Marital status:    Tobacco Use    Smoking status: Former   Vaping Use    Vaping Use: Never used   Substance and Sexual Activity    Alcohol use: No    Drug use: No    Sexual activity: Defer        Family History   Problem Relation Age of Onset    Colon cancer Brother         Review of Systems   Eyes:  Negative for visual disturbance.   Neurological:  Negative for dizziness, seizures, speech difficulty, light-headedness and headaches.       Objective     Vitals:    23 1029   BP: 116/72   Pulse: 65   Temp: 98.2 °F (36.8 °C)   SpO2: 100%   Weight: 81.7 kg (180 lb 3.2 oz)   Height: 185.4 cm (73\")     Body mass index is 23.77 kg/m².      Physical exam  Awake, alert, oriented x3  Pupils equal round reactive to light  Extraocular muscles intact  Face symmetric  Speech is fluent and clear  No pronator drift  Motor exam  Bilateral deltoids 5/5, bilateral biceps 5/5, bilateral triceps 5/5, bilateral wrist extension 5/5 bilateral hand  5/5  Bilateral hip flexion 5/5, bilateral knee extension 5/5, bilateral DF/PF 5/5  gait independent  Able to detect  light touch in all 4 extremities      Assessment & Plan   Independent Review of Radiographic Studies:      I personally reviewed the images from the following studies.    CT head:  Stable findings when compared to the prior head CT dated 2023.  Again noted is a small subdural hygroma lateral to the right frontal  lobe measuring up to 9 to 10 mm in maximal diameter. This subdural  hygroma does not exert any significant degree of mass effect upon the  adjacent brain parenchyma.     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    Imaging " was not available last visit and given the fact he was on Plavix, Eliquis and aspirin was asked to come back for repeat imaging.  Repeat imaging is stable.  He has not had any further falls or new symptoms.  He is doing great and just got cleared by ophthalmology to drive again.  He is also okay to drive again from our standpoint.  Forms have been filled out.  He can follow-up with us as needed.    Diagnoses and all orders for this visit:    1. Subdural hematoma (Primary)      Return if symptoms worsen or fail to improve.

## 2023-12-19 ENCOUNTER — OFFICE VISIT (OUTPATIENT)
Dept: NEUROSURGERY | Facility: CLINIC | Age: 88
End: 2023-12-19
Payer: OTHER GOVERNMENT

## 2023-12-19 VITALS
BODY MASS INDEX: 23.88 KG/M2 | HEIGHT: 73 IN | DIASTOLIC BLOOD PRESSURE: 72 MMHG | HEART RATE: 65 BPM | WEIGHT: 180.2 LBS | TEMPERATURE: 98.2 F | OXYGEN SATURATION: 100 % | SYSTOLIC BLOOD PRESSURE: 116 MMHG

## 2023-12-19 DIAGNOSIS — S06.5XAA SUBDURAL HEMATOMA: Primary | ICD-10-CM

## 2024-03-07 ENCOUNTER — TELEPHONE (OUTPATIENT)
Dept: NEUROSURGERY | Facility: CLINIC | Age: 89
End: 2024-03-07
Payer: OTHER GOVERNMENT

## 2024-03-07 NOTE — TELEPHONE ENCOUNTER
Patent came into office today asking if we could sign and license form out for him because his license was taken a few months back when he had his fall.     I let him know Dr. Su does not write or fill out froms for patients to drive. He suggests [facility] to do the medical evaluation for clearance to drive. Patient voiced understanding and will call them.

## 2024-04-17 ENCOUNTER — TELEPHONE (OUTPATIENT)
Dept: NEUROSURGERY | Facility: OTHER | Age: 89
End: 2024-04-17
Payer: OTHER GOVERNMENT

## 2024-04-17 NOTE — TELEPHONE ENCOUNTER
PATIENT CALLED IN REGARDING A FOLLOW UP - BUT VA AUTH  ON 01/10/24 AND HE ALSO HAS [employer] HMO AS WELL- HE STATED THAT HIS VA IS PRIMARY-    I ADVISED THAT WE WILL NEED TO GET AN UPDATED AUTH BEFORE WE CAN SEE HIM-     PATIENT IS INQUIRING ABOUT FOLLOWING UP REGARDING HIS LICENSE- HE WANTS TO BE SEEN IN THE OFFICE- DUE TO TIME PAST SINCE HE WAS LAST SEEN-    cb- [phone] (home)       PLEASE ADVISE- THANK YOU!

## 2024-04-17 NOTE — TELEPHONE ENCOUNTER
Caller: Hai Magaña    Relationship to patient: Self    Best call back number: [phone]    Patient is needing: PATIENT CALLED AND STATES THAT HE SPOKE WITH LAST FORMS THAT WERE SUBMITTED TO [facility] WERE NOT COMPLETE-PT IS NEEDING THIS SENT TO [facility] TO GET THIS APPROVED SO HE CAN MAKE ANOTHER APPT. TO BE SEEN-PT STATES THAT HE HAS PAPERWORK THAT NEEDS TO BE COMPLETED BY -BUT PATIENT CAN NOT BE SEEN WITHOUT AN AUTH. SENDING TO OFFICE AS PATIENT CALLED BACK AND WANTED TO ADD THIS TO PREVIOUS ENCOUNTER THANK YOU

## 2024-04-18 NOTE — TELEPHONE ENCOUNTER
See previous message from 3/7/24.     Patient said he submitted form from last office visit to the [employer]. He states the [employer] denied it. He said he has another form that he would need to be filled out.     Per last office he does not need to follow up in office. So her does not need a new [facility] referral.      I let him know he can bring form to the office for the providers to review to see if they will fill out or not. Patient voiced understanding.